# Patient Record
Sex: MALE | Race: WHITE | NOT HISPANIC OR LATINO | Employment: FULL TIME | URBAN - NONMETROPOLITAN AREA
[De-identification: names, ages, dates, MRNs, and addresses within clinical notes are randomized per-mention and may not be internally consistent; named-entity substitution may affect disease eponyms.]

---

## 2023-03-10 LAB
ALBUMIN (G/DL) IN SER/PLAS: 4.2 G/DL (ref 3.4–5)
ANION GAP IN SER/PLAS: 13 MMOL/L (ref 10–20)
CALCIUM (MG/DL) IN SER/PLAS: 9.4 MG/DL (ref 8.6–10.6)
CARBON DIOXIDE, TOTAL (MMOL/L) IN SER/PLAS: 27 MMOL/L (ref 21–32)
CARCINOEMBRYONIC AG (NG/ML) IN SER/PLAS: 1.4 UG/L
CHLORIDE (MMOL/L) IN SER/PLAS: 98 MMOL/L (ref 98–107)
CREATININE (MG/DL) IN SER/PLAS: 1.07 MG/DL (ref 0.5–1.3)
GFR MALE: 83 ML/MIN/1.73M2
GLUCOSE (MG/DL) IN SER/PLAS: 85 MG/DL (ref 74–99)
PARATHYRIN INTACT (PG/ML) IN SER/PLAS: 16.3 PG/ML (ref 18.5–88)
PHOSPHATE (MG/DL) IN SER/PLAS: 3.5 MG/DL (ref 2.5–4.9)
POTASSIUM (MMOL/L) IN SER/PLAS: 4.2 MMOL/L (ref 3.5–5.3)
SODIUM (MMOL/L) IN SER/PLAS: 134 MMOL/L (ref 136–145)
THYROTROPIN (MIU/L) IN SER/PLAS BY DETECTION LIMIT <= 0.05 MIU/L: 4.16 MIU/L (ref 0.44–3.98)
THYROXINE (T4) FREE (NG/DL) IN SER/PLAS: 1.83 NG/DL (ref 0.78–1.48)
UREA NITROGEN (MG/DL) IN SER/PLAS: 11 MG/DL (ref 6–23)

## 2023-03-14 LAB
CALCITONIN: <2 PG/ML (ref 0–7.5)
THYROGLOBULIN AB (IU/ML) IN SER/PLAS: <0.9 IU/ML (ref 0–4)
THYROGLOBULIN LC-MS/MS: ABNORMAL NG/ML (ref 1.3–31.8)
THYROGLOBULIN: 0.1 NG/ML (ref 1.3–31.8)

## 2023-03-29 ENCOUNTER — PATIENT MESSAGE (OUTPATIENT)
Dept: PRIMARY CARE | Facility: CLINIC | Age: 53
End: 2023-03-29
Payer: COMMERCIAL

## 2023-03-29 DIAGNOSIS — I10 HYPERTENSION, ESSENTIAL: Primary | ICD-10-CM

## 2023-03-29 RX ORDER — HYDROCHLOROTHIAZIDE 12.5 MG/1
12.5 CAPSULE ORAL EVERY MORNING
COMMUNITY
Start: 2023-01-29 | End: 2023-03-29 | Stop reason: DRUGHIGH

## 2023-03-29 RX ORDER — HYDROCHLOROTHIAZIDE 25 MG/1
25 TABLET ORAL DAILY
Qty: 90 TABLET | Refills: 3 | Status: SHIPPED | OUTPATIENT
Start: 2023-03-29 | End: 2023-04-18 | Stop reason: SDUPTHER

## 2023-04-18 ENCOUNTER — TELEPHONE (OUTPATIENT)
Dept: PRIMARY CARE | Facility: CLINIC | Age: 53
End: 2023-04-18
Payer: COMMERCIAL

## 2023-04-18 DIAGNOSIS — Z12.11 SCREEN FOR COLON CANCER: ICD-10-CM

## 2023-04-18 DIAGNOSIS — I10 HYPERTENSION, ESSENTIAL: ICD-10-CM

## 2023-04-19 NOTE — TELEPHONE ENCOUNTER
"----- Message from Megan Schneider MA sent at 2023 11:07 AM EDT -----  Regarding: FW: Borja meds.  [birth year]  Contact: [phone]    ----- Message -----  From: Fransico Borja \"Amol\"  Sent: 2023  11:06 AM EDT  To: [facility] Support Staff  Subject: Borja meds.  [birth year]                        Hi Dr. Smith.   Not urgent -     I should probably get on the docket for a colonoscopy. My sister in law has always been using cologuard as well and recently was diagnosed with cancer in her right colon.    If you could submit the orders and I’ll call them and schedule.  I’m off from work in early July so I’ll likely do it then.    I hope YOU are also doing well.   Kind Regards.   Amol Borja     "

## 2023-05-07 RX ORDER — HYDROCHLOROTHIAZIDE 25 MG/1
25 TABLET ORAL DAILY
Qty: 90 TABLET | Refills: 3 | Status: SHIPPED | OUTPATIENT
Start: 2023-05-07 | End: 2023-11-15

## 2023-08-17 ENCOUNTER — APPOINTMENT (OUTPATIENT)
Dept: PRIMARY CARE | Facility: CLINIC | Age: 53
End: 2023-08-17
Payer: COMMERCIAL

## 2023-08-24 ENCOUNTER — TELEPHONE (OUTPATIENT)
Dept: PRIMARY CARE | Facility: CLINIC | Age: 53
End: 2023-08-24
Payer: COMMERCIAL

## 2023-08-24 DIAGNOSIS — Z00.00 HEALTHCARE MAINTENANCE: Primary | ICD-10-CM

## 2023-09-08 ENCOUNTER — LAB (OUTPATIENT)
Dept: LAB | Facility: LAB | Age: 53
End: 2023-09-08
Payer: COMMERCIAL

## 2023-09-08 DIAGNOSIS — Z00.00 HEALTHCARE MAINTENANCE: ICD-10-CM

## 2023-09-08 PROCEDURE — 85027 COMPLETE CBC AUTOMATED: CPT

## 2023-09-08 PROCEDURE — 36415 COLL VENOUS BLD VENIPUNCTURE: CPT

## 2023-09-08 PROCEDURE — 80053 COMPREHEN METABOLIC PANEL: CPT

## 2023-09-08 PROCEDURE — 80061 LIPID PANEL: CPT

## 2023-09-09 LAB
ALANINE AMINOTRANSFERASE (SGPT) (U/L) IN SER/PLAS: 20 U/L (ref 10–52)
ALBUMIN (G/DL) IN SER/PLAS: 4.5 G/DL (ref 3.4–5)
ALKALINE PHOSPHATASE (U/L) IN SER/PLAS: 44 U/L (ref 33–120)
ANION GAP IN SER/PLAS: 16 MMOL/L (ref 10–20)
ASPARTATE AMINOTRANSFERASE (SGOT) (U/L) IN SER/PLAS: 22 U/L (ref 9–39)
BILIRUBIN TOTAL (MG/DL) IN SER/PLAS: 0.6 MG/DL (ref 0–1.2)
CALCIUM (MG/DL) IN SER/PLAS: 9.5 MG/DL (ref 8.6–10.6)
CARBON DIOXIDE, TOTAL (MMOL/L) IN SER/PLAS: 25 MMOL/L (ref 21–32)
CHLORIDE (MMOL/L) IN SER/PLAS: 97 MMOL/L (ref 98–107)
CHOLESTEROL (MG/DL) IN SER/PLAS: 194 MG/DL (ref 0–199)
CHOLESTEROL IN HDL (MG/DL) IN SER/PLAS: 59.4 MG/DL
CHOLESTEROL/HDL RATIO: 3.3
CREATININE (MG/DL) IN SER/PLAS: 0.86 MG/DL (ref 0.5–1.3)
ERYTHROCYTE DISTRIBUTION WIDTH (RATIO) BY AUTOMATED COUNT: 13.6 % (ref 11.5–14.5)
ERYTHROCYTE MEAN CORPUSCULAR HEMOGLOBIN CONCENTRATION (G/DL) BY AUTOMATED: 31.9 G/DL (ref 32–36)
ERYTHROCYTE MEAN CORPUSCULAR VOLUME (FL) BY AUTOMATED COUNT: 101 FL (ref 80–100)
ERYTHROCYTES (10*6/UL) IN BLOOD BY AUTOMATED COUNT: 4.3 X10E12/L (ref 4.5–5.9)
GFR MALE: >90 ML/MIN/1.73M2
GLUCOSE (MG/DL) IN SER/PLAS: 80 MG/DL (ref 74–99)
HEMATOCRIT (%) IN BLOOD BY AUTOMATED COUNT: 43.3 % (ref 41–52)
HEMOGLOBIN (G/DL) IN BLOOD: 13.8 G/DL (ref 13.5–17.5)
LDL: 122 MG/DL (ref 0–99)
LEUKOCYTES (10*3/UL) IN BLOOD BY AUTOMATED COUNT: 4.3 X10E9/L (ref 4.4–11.3)
NRBC (PER 100 WBCS) BY AUTOMATED COUNT: 0 /100 WBC (ref 0–0)
PLATELETS (10*3/UL) IN BLOOD AUTOMATED COUNT: 210 X10E9/L (ref 150–450)
POTASSIUM (MMOL/L) IN SER/PLAS: 4 MMOL/L (ref 3.5–5.3)
PROTEIN TOTAL: 7.1 G/DL (ref 6.4–8.2)
SODIUM (MMOL/L) IN SER/PLAS: 134 MMOL/L (ref 136–145)
TRIGLYCERIDE (MG/DL) IN SER/PLAS: 63 MG/DL (ref 0–149)
UREA NITROGEN (MG/DL) IN SER/PLAS: 15 MG/DL (ref 6–23)
VLDL: 13 MG/DL (ref 0–40)

## 2023-09-12 PROBLEM — C73 PAPILLARY THYROID CARCINOMA (MULTI): Status: ACTIVE | Noted: 2023-09-12

## 2023-09-12 PROBLEM — D72.819 LEUKOPENIA: Status: ACTIVE | Noted: 2023-09-12

## 2023-09-12 PROBLEM — C73 THYROID CANCER, MEDULLARY CARCINOMA (MULTI): Status: ACTIVE | Noted: 2023-09-12

## 2023-09-12 PROBLEM — E78.5 HYPERLIPEMIA: Status: ACTIVE | Noted: 2023-09-12

## 2023-09-12 PROBLEM — E31.22: Status: ACTIVE | Noted: 2023-09-12

## 2023-09-12 PROBLEM — E89.2: Status: ACTIVE | Noted: 2023-09-12

## 2023-09-12 PROBLEM — I10 BENIGN ESSENTIAL HYPERTENSION: Status: ACTIVE | Noted: 2023-09-12

## 2023-09-12 PROBLEM — Z14.8: Status: ACTIVE | Noted: 2023-09-12

## 2023-09-12 PROBLEM — N52.9 MALE ERECTILE DISORDER: Status: ACTIVE | Noted: 2023-09-12

## 2023-09-12 PROBLEM — M62.08 DIASTASIS RECTI: Status: ACTIVE | Noted: 2023-09-12

## 2023-09-12 PROBLEM — F41.8 PERFORMANCE ANXIETY: Status: ACTIVE | Noted: 2023-09-12

## 2023-09-12 RX ORDER — LEVOTHYROXINE SODIUM 150 UG/1
TABLET ORAL
COMMUNITY
End: 2023-12-12 | Stop reason: SDUPTHER

## 2023-09-12 RX ORDER — ASPIRIN 81 MG/1
1 TABLET ORAL DAILY
COMMUNITY
Start: 2018-04-27

## 2023-09-12 RX ORDER — SILDENAFIL CITRATE 20 MG/1
TABLET ORAL
COMMUNITY

## 2023-09-12 RX ORDER — ROSUVASTATIN CALCIUM 10 MG/1
1 TABLET, COATED ORAL DAILY
COMMUNITY
Start: 2019-03-29 | End: 2024-01-03 | Stop reason: SDUPTHER

## 2023-09-12 RX ORDER — LISINOPRIL 20 MG/1
1 TABLET ORAL DAILY
COMMUNITY
Start: 2017-06-26 | End: 2024-01-03 | Stop reason: SDUPTHER

## 2023-09-12 NOTE — PROGRESS NOTES
Subjective   Patient ID: Amol Borja is a 53 y.o. male who presents for Flu Vaccine (Pt has already had flu vaccine at this time. ) and Follow-up (Pt presents for 6 month follow up- pt states no concerns at this time. ).    HPI     Patient presents today for routine 6 month follow-up.  Patient is doing well overall, they have no new concerns or issues.     He had colonoscopy done July 2023 as sister was diagnosed with colon cancer recently. This scope showed normal mucosa, some internal hemorrhoids but no biopsy taken, repeat in 10 years per GI.    Has pork 1/2 lb every other week.  Will splurge once weekly when having breakfast with his dad.    CHRONIC CONDITIONS:  -Mood  Hx of anxiety, situational/performance related.  He is prescribed Propranolol 60 mg for as needed use.    -Leukopenia  Chronic in past, most recent baseline for patient.  9/2023 WBC 4.3     -HTN  2/2023 BP cuff validated.  9/2023 CMP    Current regimen:  Lisinopril 20 mg daily  HCTZ 25 mg daily, increased from 12.5 in 2/2023 due suboptimal control.    Medications are being taken and tolerated well.   No side effects are reported.  Patient is taking blood pressure outside of office, home numbers show BP is 120/70 this morning with calibrated cuff.  Patient denies chest pain, shortness of breath with exertion, palpitations, lower extremity edema, headache, or dizziness.     -HLD/CAD  Taking Rosuvastatin 10 mg daily.   4/2022 CT chest showed mild CAD but no CACS in our records.  9/2023 TC/HDL ratio of 3.3 ()    Medication(s) are being taken as directed and tolerated well.   No side effects reported.  Patient denies any facial droop, sudden vision loss, weakness or numbness on one side of body.   Patient denies chest pain, shortness of breath with exertion, palpitations, or symptoms of claudication.      -Post-surgical hypoparathyroidism  Followed by sanya (Dr. Gutierrez).  Hx of medullary/papillary thyroid cancer, biopsy confirmed (hx of  "MEN-2).  S/p total thyroidectomy 6/2022, calcitonin level trended down from 78 to <2.0, subsequently started on Synthroid therapy.  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  Periodic monitoring of TFTs, calcitonin, and CEA levels done via endo.  Labs last done 3/2023, synthroid dose increased, repeat labs to be done June 2023 per endo note.    -Aortic dilation  No FMHx of aortic aneurysm per patient.  4/2022 CT chest with 4 cm ectasia of the ascending thoracic aorta  Due for repeat CT in 4/2023.  3/2023 CT chest with mild fusiform ectasia of the ascending thoracic aorta measuring 4 cm in AP diameter similar to prior.    -Lung nodule  4/2022 CT chest small bilateral pulmonary nodules measuring up to 0.5 cm are nonspecific and likely benign. However, continued follow-up is recommended per clinical staging protocol given patient has history of thyroid cancer.  3/2023 CT chest with previously described 5 mm to right lower lobe nodule unchanged, likely benign.    Review of Systems   All other systems reviewed and are negative.      Objective   /90 (BP Location: Left arm, Patient Position: Sitting, BP Cuff Size: Large adult)   Pulse 69   Temp 36.8 °C (98.2 °F) (Temporal)   Resp 14   Ht 1.854 m (6' 1\")   Wt 102 kg (224 lb 3.2 oz)   SpO2 97%   BMI 29.58 kg/m²     Physical Exam  Vitals and nursing note reviewed.   Constitutional:       General: He is not in acute distress.     Appearance: Normal appearance. He is not toxic-appearing.   HENT:      Head: Normocephalic and atraumatic.   Cardiovascular:      Rate and Rhythm: Normal rate and regular rhythm.      Heart sounds: No murmur heard.     No friction rub. No gallop.   Pulmonary:      Effort: Pulmonary effort is normal.      Breath sounds: Normal breath sounds. No wheezing, " rhonchi or rales.   Musculoskeletal:      Right lower leg: No edema.      Left lower leg: No edema.   Lymphadenopathy:      Cervical: No cervical adenopathy.   Skin:     General: Skin is warm and dry.   Neurological:      General: No focal deficit present.      Mental Status: He is alert and oriented to person, place, and time.   Psychiatric:         Mood and Affect: Mood normal.         Behavior: Behavior normal.         Assessment/Plan   Problem List Items Addressed This Visit       Benign essential hypertension - Primary     BP is 147/90 in office today, this is mildly elevated. However, patient reports normotensive pressures at home with cuff that has been calibrated in office. We will treat based off home numbers.    Continue Lisinopril 20 mg daily.  Continue HCTZ 25 mg daily.         Hyperlipemia     4/2022 CT chest showed mild CAD but no CACS in our records.  9/2023 TC/HDL ratio of 3.3 ()  Goal TC/HDL ratio 3.4 or less, LDL 99 or less,  or less, and TRIG 150 or less.   Cholesterol at goal, continue with Rosuvastatin 10 mg daily.          Leukopenia     Chronic in past, most recent baseline for patient.  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 Gutierrez)  S/p total thyroidectomy 6/2022, currently on Synthroid therapy.  Periodic monitoring of TFTs, calcitonin, and CEA levels done via endo.   Stable at most recent check per patient.         Post-surgical hypoparathyroidism (CMS/HCC)     Followed and managed by endo (Dr. Gutierrez)  S/p total thyroidectomy 6/2022, currently on Synthroid therapy.  Periodic monitoring of TFTs, calcitonin, and CEA levels done via endo.   Stable at most recent check per patient.         Thyroid cancer, medullary carcinoma (CMS/HCC)     Followed and managed by endo (Dr. Gutierrez)  S/p total thyroidectomy 6/2022, currently on Synthroid therapy.  Periodic monitoring of TFTs, calcitonin, and CEA levels done  via endo.   Stable at most recent check per patient.         Papillary thyroid carcinoma (CMS/HCC)     Followed and managed by endo (Dr. Gutierrez)  S/p total thyroidectomy 6/2022, currently on Synthroid therapy.  Periodic monitoring of TFTs, calcitonin, and CEA levels done via endo.   Stable at most recent check per patient.         Ascending aorta dilation (CMS/HCC)     Stable per most recent CT chest done 3/2023 which showed mild fusiform ectasia of the ascending thoracic aorta measuring 4 cm in AP diameter similar to prior. No family history aneurysm, no further follow-up indicated at this time.          Labs ordered to be done in 6 months (these were linked to physical).  Follow-up in 6 months for routine care + CPE, can move to 1 year if labs are WNL.  Call for sooner follow-up if needed.     Scribe Attestation  By signing my name below, IYanira Scribe   attest that this documentation has been prepared under the direction and in the presence of Romi Smith DO.

## 2023-09-13 ENCOUNTER — OFFICE VISIT (OUTPATIENT)
Dept: PRIMARY CARE | Facility: CLINIC | Age: 53
End: 2023-09-13
Payer: COMMERCIAL

## 2023-09-13 VITALS
WEIGHT: 224.2 LBS | RESPIRATION RATE: 14 BRPM | OXYGEN SATURATION: 97 % | SYSTOLIC BLOOD PRESSURE: 147 MMHG | TEMPERATURE: 98.2 F | HEART RATE: 69 BPM | HEIGHT: 73 IN | DIASTOLIC BLOOD PRESSURE: 90 MMHG | BODY MASS INDEX: 29.72 KG/M2

## 2023-09-13 DIAGNOSIS — E31.22: ICD-10-CM

## 2023-09-13 DIAGNOSIS — E89.2 POST-SURGICAL HYPOPARATHYROIDISM (MULTI): ICD-10-CM

## 2023-09-13 DIAGNOSIS — C73 THYROID CANCER, MEDULLARY CARCINOMA (MULTI): ICD-10-CM

## 2023-09-13 DIAGNOSIS — I77.810 ASCENDING AORTA DILATION (CMS-HCC): ICD-10-CM

## 2023-09-13 DIAGNOSIS — C73 PAPILLARY THYROID CARCINOMA (MULTI): ICD-10-CM

## 2023-09-13 DIAGNOSIS — Z00.00 HEALTHCARE MAINTENANCE: ICD-10-CM

## 2023-09-13 DIAGNOSIS — I10 BENIGN ESSENTIAL HYPERTENSION: Primary | ICD-10-CM

## 2023-09-13 DIAGNOSIS — E78.5 HYPERLIPIDEMIA, UNSPECIFIED HYPERLIPIDEMIA TYPE: ICD-10-CM

## 2023-09-13 DIAGNOSIS — Z12.5 SCREENING FOR PROSTATE CANCER: ICD-10-CM

## 2023-09-13 DIAGNOSIS — D72.819 LEUKOPENIA, UNSPECIFIED TYPE: ICD-10-CM

## 2023-09-13 PROBLEM — R91.1 LUNG NODULE: Status: ACTIVE | Noted: 2023-09-13

## 2023-09-13 PROBLEM — I25.10 MILD CAD: Status: ACTIVE | Noted: 2023-09-13

## 2023-09-13 PROCEDURE — 99214 OFFICE O/P EST MOD 30 MIN: CPT | Performed by: FAMILY MEDICINE

## 2023-09-13 PROCEDURE — 3080F DIAST BP >= 90 MM HG: CPT | Performed by: FAMILY MEDICINE

## 2023-09-13 PROCEDURE — 3077F SYST BP >= 140 MM HG: CPT | Performed by: FAMILY MEDICINE

## 2023-09-13 PROCEDURE — 1036F TOBACCO NON-USER: CPT | Performed by: FAMILY MEDICINE

## 2023-09-13 RX ORDER — PROPRANOLOL HYDROCHLORIDE 60 MG/1
60 CAPSULE, EXTENDED RELEASE ORAL DAILY
COMMUNITY
End: 2024-01-04 | Stop reason: SDUPTHER

## 2023-09-13 NOTE — ASSESSMENT & PLAN NOTE
Stable per most recent CT chest done 3/2023 which showed mild fusiform ectasia of the ascending thoracic aorta measuring 4 cm in AP diameter similar to prior. No family history aneurysm, no further follow-up indicated at this time.

## 2023-09-13 NOTE — ASSESSMENT & PLAN NOTE
Followed and managed by endo (Dr. Gutierrez)  S/p total thyroidectomy 6/2022, currently on Synthroid therapy.  Periodic monitoring of TFTs, calcitonin, and CEA levels done via endo.   Stable at most recent check per patient.

## 2023-09-13 NOTE — ASSESSMENT & PLAN NOTE
4/2022 CT chest showed mild CAD but no CACS in our records.  9/2023 TC/HDL ratio of 3.3 ()  Goal TC/HDL ratio 3.4 or less, LDL 99 or less,  or less, and TRIG 150 or less.   Cholesterol at goal, continue with Rosuvastatin 10 mg daily.

## 2023-09-13 NOTE — ASSESSMENT & PLAN NOTE
BP is 147/90 in office today, this is mildly elevated. However, patient reports normotensive pressures at home with cuff that has been calibrated in office. We will treat based off home numbers.    Continue Lisinopril 20 mg daily.  Continue HCTZ 25 mg daily.

## 2023-11-15 DIAGNOSIS — I10 HYPERTENSION, ESSENTIAL: ICD-10-CM

## 2023-11-15 RX ORDER — HYDROCHLOROTHIAZIDE 25 MG/1
25 TABLET ORAL DAILY
Qty: 90 TABLET | Refills: 3 | Status: SHIPPED | OUTPATIENT
Start: 2023-11-15

## 2023-11-16 DIAGNOSIS — E89.2 POST-SURGICAL HYPOPARATHYROIDISM (MULTI): ICD-10-CM

## 2023-11-16 DIAGNOSIS — C73 PAPILLARY THYROID CARCINOMA (MULTI): ICD-10-CM

## 2023-11-16 DIAGNOSIS — E31.22: Primary | ICD-10-CM

## 2023-11-17 NOTE — PROGRESS NOTES
Patient requested lab orders to be placed. He last completed bloodwork in March 2023. Per documentation at that time, levothyroxine was increased to 150mcg 7.5x/week (from 7x/week).     I have placed orders for TSH, FT4, thyroglobulin, CEA, calcitonin, RFP, and PTH. I also instructed patient that he needs to make a follow-up appointment with us and gave him the scheduling phone number (he was last seen in clinic in 8/2022).

## 2023-12-01 ENCOUNTER — LAB (OUTPATIENT)
Dept: LAB | Facility: LAB | Age: 53
End: 2023-12-01
Payer: COMMERCIAL

## 2023-12-01 DIAGNOSIS — C73 PAPILLARY THYROID CARCINOMA (MULTI): ICD-10-CM

## 2023-12-01 DIAGNOSIS — E89.2 POST-SURGICAL HYPOPARATHYROIDISM (MULTI): ICD-10-CM

## 2023-12-01 DIAGNOSIS — E31.22: ICD-10-CM

## 2023-12-01 LAB
ALBUMIN SERPL BCP-MCNC: 4.7 G/DL (ref 3.4–5)
ANION GAP SERPL CALC-SCNC: 12 MMOL/L (ref 10–20)
BUN SERPL-MCNC: 19 MG/DL (ref 6–23)
CALCIUM SERPL-MCNC: 9.6 MG/DL (ref 8.6–10.6)
CEA SERPL-MCNC: 1.5 UG/L
CHLORIDE SERPL-SCNC: 99 MMOL/L (ref 98–107)
CO2 SERPL-SCNC: 28 MMOL/L (ref 21–32)
CREAT SERPL-MCNC: 1.1 MG/DL (ref 0.5–1.3)
GFR SERPL CREATININE-BSD FRML MDRD: 80 ML/MIN/1.73M*2
GLUCOSE SERPL-MCNC: 99 MG/DL (ref 74–99)
PHOSPHATE SERPL-MCNC: 3.7 MG/DL (ref 2.5–4.9)
POTASSIUM SERPL-SCNC: 4 MMOL/L (ref 3.5–5.3)
PTH-INTACT SERPL-MCNC: 15.1 PG/ML (ref 18.5–88)
SODIUM SERPL-SCNC: 135 MMOL/L (ref 136–145)
T4 FREE SERPL-MCNC: 1.84 NG/DL (ref 0.78–1.48)
TSH SERPL-ACNC: 1.37 MIU/L (ref 0.44–3.98)

## 2023-12-01 PROCEDURE — 82308 ASSAY OF CALCITONIN: CPT

## 2023-12-01 PROCEDURE — 83970 ASSAY OF PARATHORMONE: CPT

## 2023-12-01 PROCEDURE — 86800 THYROGLOBULIN ANTIBODY: CPT

## 2023-12-01 PROCEDURE — 36415 COLL VENOUS BLD VENIPUNCTURE: CPT

## 2023-12-01 PROCEDURE — 84432 ASSAY OF THYROGLOBULIN: CPT

## 2023-12-01 PROCEDURE — 82378 CARCINOEMBRYONIC ANTIGEN: CPT

## 2023-12-01 PROCEDURE — 80069 RENAL FUNCTION PANEL: CPT

## 2023-12-01 PROCEDURE — 84443 ASSAY THYROID STIM HORMONE: CPT

## 2023-12-01 PROCEDURE — 84439 ASSAY OF FREE THYROXINE: CPT

## 2023-12-02 LAB
BILL ONLY-THYROGLOBULIN: NORMAL
CALCIT SERPL-MCNC: <2 PG/ML (ref 0–7.5)
THYROGLOB AB SERPL-ACNC: <0.9 IU/ML (ref 0–4)
THYROGLOB SERPL-MCNC: 0.1 NG/ML (ref 1.3–31.8)
THYROGLOB SERPL-MCNC: ABNORMAL NG/ML (ref 1.3–31.8)

## 2023-12-12 ENCOUNTER — TELEPHONE (OUTPATIENT)
Dept: ENDOCRINOLOGY | Facility: HOSPITAL | Age: 53
End: 2023-12-12
Payer: COMMERCIAL

## 2023-12-12 DIAGNOSIS — C73 PAPILLARY THYROID CARCINOMA (MULTI): ICD-10-CM

## 2023-12-12 DIAGNOSIS — C73 THYROID CANCER, MEDULLARY CARCINOMA (MULTI): Primary | ICD-10-CM

## 2023-12-12 RX ORDER — LEVOTHYROXINE SODIUM 150 UG/1
150 TABLET ORAL DAILY
Qty: 90 TABLET | Refills: 3 | Status: SHIPPED | OUTPATIENT
Start: 2023-12-12 | End: 2024-02-13 | Stop reason: SDUPTHER

## 2024-01-03 DIAGNOSIS — I10 BENIGN ESSENTIAL HYPERTENSION: ICD-10-CM

## 2024-01-03 DIAGNOSIS — E78.5 HYPERLIPIDEMIA, UNSPECIFIED HYPERLIPIDEMIA TYPE: ICD-10-CM

## 2024-01-04 RX ORDER — PROPRANOLOL HYDROCHLORIDE 60 MG/1
60 CAPSULE, EXTENDED RELEASE ORAL DAILY
Qty: 90 CAPSULE | Refills: 0 | Status: SHIPPED | OUTPATIENT
Start: 2024-01-04

## 2024-01-04 RX ORDER — ROSUVASTATIN CALCIUM 10 MG/1
10 TABLET, COATED ORAL DAILY
Qty: 90 TABLET | Refills: 0 | Status: SHIPPED | OUTPATIENT
Start: 2024-01-04 | End: 2024-05-29

## 2024-01-04 RX ORDER — LISINOPRIL 20 MG/1
20 TABLET ORAL DAILY
Qty: 90 TABLET | Refills: 0 | Status: SHIPPED | OUTPATIENT
Start: 2024-01-04 | End: 2024-05-29

## 2024-02-02 ENCOUNTER — LAB (OUTPATIENT)
Dept: LAB | Facility: LAB | Age: 54
End: 2024-02-02
Payer: COMMERCIAL

## 2024-02-13 DIAGNOSIS — C73 PAPILLARY THYROID CARCINOMA (MULTI): ICD-10-CM

## 2024-02-13 DIAGNOSIS — C73 THYROID CANCER, MEDULLARY CARCINOMA (MULTI): Primary | ICD-10-CM

## 2024-02-13 RX ORDER — LEVOTHYROXINE SODIUM 150 UG/1
150 TABLET ORAL DAILY
Qty: 90 TABLET | Refills: 0 | Status: SHIPPED | OUTPATIENT
Start: 2024-02-13 | End: 2024-05-13

## 2024-02-13 RX ORDER — LEVOTHYROXINE SODIUM 150 UG/1
150 TABLET ORAL DAILY
Qty: 90 TABLET | Refills: 3 | Status: SHIPPED | OUTPATIENT
Start: 2024-02-13

## 2024-02-13 NOTE — PROGRESS NOTES
Patient requesting renewal of levothyroxine, sent to pharmacy. Has appointment scheduled for 5/31.

## 2024-03-08 ENCOUNTER — LAB (OUTPATIENT)
Dept: LAB | Facility: LAB | Age: 54
End: 2024-03-08
Payer: COMMERCIAL

## 2024-03-08 DIAGNOSIS — Z12.5 SCREENING FOR PROSTATE CANCER: ICD-10-CM

## 2024-03-08 DIAGNOSIS — Z00.00 HEALTHCARE MAINTENANCE: ICD-10-CM

## 2024-03-08 LAB
ANION GAP SERPL CALC-SCNC: 12 MMOL/L (ref 10–20)
BUN SERPL-MCNC: 14 MG/DL (ref 6–23)
CALCIUM SERPL-MCNC: 9.6 MG/DL (ref 8.6–10.6)
CHLORIDE SERPL-SCNC: 100 MMOL/L (ref 98–107)
CHOLEST SERPL-MCNC: 160 MG/DL (ref 0–199)
CHOLESTEROL/HDL RATIO: 3.2
CO2 SERPL-SCNC: 30 MMOL/L (ref 21–32)
CREAT SERPL-MCNC: 1.01 MG/DL (ref 0.5–1.3)
EGFRCR SERPLBLD CKD-EPI 2021: 88 ML/MIN/1.73M*2
ERYTHROCYTE [DISTWIDTH] IN BLOOD BY AUTOMATED COUNT: 13.2 % (ref 11.5–14.5)
GLUCOSE SERPL-MCNC: 86 MG/DL (ref 74–99)
HCT VFR BLD AUTO: 43.2 % (ref 41–52)
HDLC SERPL-MCNC: 49.9 MG/DL
HGB BLD-MCNC: 13.4 G/DL (ref 13.5–17.5)
LDLC SERPL CALC-MCNC: 97 MG/DL
MCH RBC QN AUTO: 30.7 PG (ref 26–34)
MCHC RBC AUTO-ENTMCNC: 31 G/DL (ref 32–36)
MCV RBC AUTO: 99 FL (ref 80–100)
NON HDL CHOLESTEROL: 110 MG/DL (ref 0–149)
NRBC BLD-RTO: 0 /100 WBCS (ref 0–0)
PLATELET # BLD AUTO: 202 X10*3/UL (ref 150–450)
POTASSIUM SERPL-SCNC: 4 MMOL/L (ref 3.5–5.3)
PSA SERPL-MCNC: 1.91 NG/ML
RBC # BLD AUTO: 4.37 X10*6/UL (ref 4.5–5.9)
SODIUM SERPL-SCNC: 138 MMOL/L (ref 136–145)
TRIGL SERPL-MCNC: 67 MG/DL (ref 0–149)
VLDL: 13 MG/DL (ref 0–40)
WBC # BLD AUTO: 4.4 X10*3/UL (ref 4.4–11.3)

## 2024-03-08 PROCEDURE — 80048 BASIC METABOLIC PNL TOTAL CA: CPT

## 2024-03-08 PROCEDURE — 84153 ASSAY OF PSA TOTAL: CPT

## 2024-03-08 PROCEDURE — 85027 COMPLETE CBC AUTOMATED: CPT

## 2024-03-08 PROCEDURE — 36415 COLL VENOUS BLD VENIPUNCTURE: CPT

## 2024-03-08 PROCEDURE — 80061 LIPID PANEL: CPT

## 2024-03-14 ENCOUNTER — APPOINTMENT (OUTPATIENT)
Dept: PRIMARY CARE | Facility: CLINIC | Age: 54
End: 2024-03-14
Payer: COMMERCIAL

## 2024-03-18 ENCOUNTER — TELEPHONE (OUTPATIENT)
Dept: PRIMARY CARE | Facility: CLINIC | Age: 54
End: 2024-03-18
Payer: COMMERCIAL

## 2024-03-18 DIAGNOSIS — Z00.00 HEALTHCARE MAINTENANCE: Primary | ICD-10-CM

## 2024-03-18 DIAGNOSIS — Z12.5 SCREENING FOR PROSTATE CANCER: ICD-10-CM

## 2024-03-19 NOTE — TELEPHONE ENCOUNTER
Called and spoke with pt, and got his apt rescheduled to 9/26/24. Pt would like you to place orders for labs to be done prior to his apt. Please advise.

## 2024-03-19 NOTE — TELEPHONE ENCOUNTER
Pls cancel upcoming appt w pt ,    pls call him to schedule a 6 mo     (Labs all normal,  bp's good per pt)       Thx

## 2024-03-28 ENCOUNTER — APPOINTMENT (OUTPATIENT)
Dept: PRIMARY CARE | Facility: CLINIC | Age: 54
End: 2024-03-28
Payer: COMMERCIAL

## 2024-03-28 DIAGNOSIS — I10 BENIGN ESSENTIAL HYPERTENSION: ICD-10-CM

## 2024-03-28 RX ORDER — PROPRANOLOL HYDROCHLORIDE 60 MG/1
CAPSULE, EXTENDED RELEASE ORAL
Qty: 90 CAPSULE | Refills: 3 | OUTPATIENT
Start: 2024-03-28

## 2024-05-07 DIAGNOSIS — C73 THYROID CANCER, MEDULLARY CARCINOMA (MULTI): ICD-10-CM

## 2024-05-07 DIAGNOSIS — C73 PAPILLARY THYROID CARCINOMA (MULTI): Primary | ICD-10-CM

## 2024-05-10 ENCOUNTER — LAB (OUTPATIENT)
Dept: LAB | Facility: LAB | Age: 54
End: 2024-05-10
Payer: COMMERCIAL

## 2024-05-10 DIAGNOSIS — C73 THYROID CANCER, MEDULLARY CARCINOMA (MULTI): ICD-10-CM

## 2024-05-10 DIAGNOSIS — C73 PAPILLARY THYROID CARCINOMA (MULTI): ICD-10-CM

## 2024-05-10 LAB
ALBUMIN SERPL BCP-MCNC: 4.2 G/DL (ref 3.4–5)
ANION GAP SERPL CALC-SCNC: 15 MMOL/L (ref 10–20)
BUN SERPL-MCNC: 18 MG/DL (ref 6–23)
CALCIUM SERPL-MCNC: 9.4 MG/DL (ref 8.6–10.6)
CEA SERPL-MCNC: 0.5 UG/L
CHLORIDE SERPL-SCNC: 101 MMOL/L (ref 98–107)
CO2 SERPL-SCNC: 26 MMOL/L (ref 21–32)
CREAT SERPL-MCNC: 1.09 MG/DL (ref 0.5–1.3)
EGFRCR SERPLBLD CKD-EPI 2021: 80 ML/MIN/1.73M*2
GLUCOSE SERPL-MCNC: 85 MG/DL (ref 74–99)
PHOSPHATE SERPL-MCNC: 3.2 MG/DL (ref 2.5–4.9)
POTASSIUM SERPL-SCNC: 4.6 MMOL/L (ref 3.5–5.3)
PTH-INTACT SERPL-MCNC: 14.6 PG/ML (ref 18.5–88)
SODIUM SERPL-SCNC: 137 MMOL/L (ref 136–145)
T4 FREE SERPL-MCNC: 1.77 NG/DL (ref 0.78–1.48)
TSH SERPL-ACNC: 1.36 MIU/L (ref 0.44–3.98)

## 2024-05-10 PROCEDURE — 36415 COLL VENOUS BLD VENIPUNCTURE: CPT

## 2024-05-10 PROCEDURE — 84439 ASSAY OF FREE THYROXINE: CPT

## 2024-05-10 PROCEDURE — 84432 ASSAY OF THYROGLOBULIN: CPT

## 2024-05-10 PROCEDURE — 86800 THYROGLOBULIN ANTIBODY: CPT

## 2024-05-10 PROCEDURE — 84443 ASSAY THYROID STIM HORMONE: CPT

## 2024-05-10 PROCEDURE — 82308 ASSAY OF CALCITONIN: CPT

## 2024-05-10 PROCEDURE — 83970 ASSAY OF PARATHORMONE: CPT

## 2024-05-10 PROCEDURE — 80069 RENAL FUNCTION PANEL: CPT

## 2024-05-10 PROCEDURE — 82378 CARCINOEMBRYONIC ANTIGEN: CPT

## 2024-05-11 LAB
BILL ONLY-THYROGLOBULIN: NORMAL
THYROGLOB AB SERPL-ACNC: <0.9 IU/ML (ref 0–4)
THYROGLOB SERPL-MCNC: 0.1 NG/ML (ref 1.3–31.8)
THYROGLOB SERPL-MCNC: ABNORMAL NG/ML (ref 1.3–31.8)

## 2024-05-12 LAB — CALCIT SERPL-MCNC: <2 PG/ML (ref 0–7.5)

## 2024-05-29 DIAGNOSIS — I10 BENIGN ESSENTIAL HYPERTENSION: ICD-10-CM

## 2024-05-29 DIAGNOSIS — E78.5 HYPERLIPIDEMIA, UNSPECIFIED HYPERLIPIDEMIA TYPE: ICD-10-CM

## 2024-05-29 RX ORDER — ROSUVASTATIN CALCIUM 10 MG/1
10 TABLET, COATED ORAL DAILY
Qty: 90 TABLET | Refills: 0 | Status: SHIPPED | OUTPATIENT
Start: 2024-05-29

## 2024-05-29 RX ORDER — LISINOPRIL 20 MG/1
20 TABLET ORAL DAILY
Qty: 90 TABLET | Refills: 0 | Status: SHIPPED | OUTPATIENT
Start: 2024-05-29

## 2024-05-31 ENCOUNTER — OFFICE VISIT (OUTPATIENT)
Dept: ENDOCRINOLOGY | Facility: CLINIC | Age: 54
End: 2024-05-31
Payer: COMMERCIAL

## 2024-05-31 VITALS
HEIGHT: 73 IN | WEIGHT: 227 LBS | DIASTOLIC BLOOD PRESSURE: 95 MMHG | HEART RATE: 72 BPM | BODY MASS INDEX: 30.09 KG/M2 | SYSTOLIC BLOOD PRESSURE: 152 MMHG

## 2024-05-31 DIAGNOSIS — C73 THYROID CANCER (MULTI): Primary | ICD-10-CM

## 2024-05-31 PROCEDURE — 3080F DIAST BP >= 90 MM HG: CPT | Performed by: INTERNAL MEDICINE

## 2024-05-31 PROCEDURE — 99214 OFFICE O/P EST MOD 30 MIN: CPT | Performed by: INTERNAL MEDICINE

## 2024-05-31 PROCEDURE — 3077F SYST BP >= 140 MM HG: CPT | Performed by: INTERNAL MEDICINE

## 2024-05-31 PROCEDURE — 1036F TOBACCO NON-USER: CPT | Performed by: INTERNAL MEDICINE

## 2024-05-31 ASSESSMENT — PAIN SCALES - GENERAL: PAINLEVEL: 0-NO PAIN

## 2024-05-31 NOTE — PROGRESS NOTES
Chief Complaint     Patient presents here today for a follow up visit for medullar and papillary thyroid cancer.      History of Present IllnessFransico Borja is a 54 year old male who presents for a routine follow up of medullary and papillary thyroid cancer.     Thyroid Cancer History:  54 year old male with MEN-2 syndrome and an extensive family history for medullary thyroid cancer. The patient was evaluated in our clinic for pheochromocytoma and there was no clinical or biochemical evidence of pheochromocytoma. He was found to have a nodular thyroid gland on CT scan. 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 He is status post total thyroidectomy with central neck dissection complicated by compressive hematoma and taken back emergently to the OR for neck exploration with hemostasis control on 6/28/22.      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  Patient is doing well with no acute concerns. Taking calcium as prescribed. He denies paresthesias of his hands/feet, perioral region and extremities. Appropriate energy. Regular bowel movements. No temperature intolerances. No dysphagia or dyspnea. Has cramps in his hands/ feet at times.   10 point ROS is obtained and negative except as mentioned above.  BP is ok on meds; no changes   Meds: T 4 150 micrograms x 8/week.       Physical Exam  Constitutional: well-developed, well-nourished,  male, in no acute distress   HEENT: clear sclera, EOMI  Skin: Skin is appropriately  warm & dry.   Neck Supple. No lymphadenopathy, thyroid not palpable, surgical scar well healed  Pulmonary: Clear to auscultation bilaterally.   Cardiovascular: Regular rate and rhythm. Normal S1 and S2, no gallops, no murmurs   Musculoskeletal: No proximal muscle weakness   Neurologic : AAO3. No gait abnormalities. Deep tendon reflexes are normal and without delay in the return phase. Chvostek negative.  Psych: pleasant, cooperative    Impression/ Plan    Doing well; clinically and biochemically euthyroid. Recent labs reviewed all OK. No change in meds. Will keep same; return in one year.      David Gutierrez MD

## 2024-07-10 ENCOUNTER — TELEPHONE (OUTPATIENT)
Dept: PRIMARY CARE | Facility: CLINIC | Age: 54
End: 2024-07-10
Payer: COMMERCIAL

## 2024-07-10 NOTE — TELEPHONE ENCOUNTER
Called and spoke with pt, and he was informed and verbalized understanding. Pt is scheduled to be seen in about 3 weeks with SVN for this.

## 2024-07-31 ENCOUNTER — APPOINTMENT (OUTPATIENT)
Dept: PRIMARY CARE | Facility: CLINIC | Age: 54
End: 2024-07-31
Payer: COMMERCIAL

## 2024-07-31 VITALS
HEART RATE: 59 BPM | DIASTOLIC BLOOD PRESSURE: 83 MMHG | TEMPERATURE: 97.6 F | SYSTOLIC BLOOD PRESSURE: 128 MMHG | OXYGEN SATURATION: 96 % | WEIGHT: 231 LBS | BODY MASS INDEX: 30.48 KG/M2

## 2024-07-31 DIAGNOSIS — F41.8 PERFORMANCE ANXIETY: Primary | ICD-10-CM

## 2024-07-31 DIAGNOSIS — M76.60 ACHILLES TENDINITIS, UNSPECIFIED LATERALITY: ICD-10-CM

## 2024-07-31 PROCEDURE — 99213 OFFICE O/P EST LOW 20 MIN: CPT | Performed by: FAMILY MEDICINE

## 2024-07-31 PROCEDURE — 3074F SYST BP LT 130 MM HG: CPT | Performed by: FAMILY MEDICINE

## 2024-07-31 PROCEDURE — 3079F DIAST BP 80-89 MM HG: CPT | Performed by: FAMILY MEDICINE

## 2024-07-31 PROCEDURE — 1036F TOBACCO NON-USER: CPT | Performed by: FAMILY MEDICINE

## 2024-07-31 RX ORDER — MELOXICAM 15 MG/1
15 TABLET ORAL DAILY
Qty: 30 TABLET | Refills: 11 | Status: SHIPPED | OUTPATIENT
Start: 2024-07-31 | End: 2024-07-31

## 2024-07-31 RX ORDER — DICLOFENAC SODIUM 10 MG/G
GEL TOPICAL
Qty: 50 G | Refills: 1 | Status: SHIPPED | OUTPATIENT
Start: 2024-07-31

## 2024-07-31 RX ORDER — PROPRANOLOL HYDROCHLORIDE 60 MG/1
60 CAPSULE, EXTENDED RELEASE ORAL DAILY
Qty: 90 CAPSULE | Refills: 3 | Status: SHIPPED | OUTPATIENT
Start: 2024-07-31

## 2024-07-31 RX ORDER — MELOXICAM 15 MG/1
15 TABLET ORAL DAILY
Qty: 30 TABLET | Refills: 11 | Status: SHIPPED | OUTPATIENT
Start: 2024-07-31 | End: 2025-07-31

## 2024-07-31 NOTE — PROGRESS NOTES
Subjective   Patient ID: Amol Borja is a 54 y.o. male who presents for Follow-up (States bad right achilles, swollen, was working on an incline, not taking or trying anything for it to make better and stating doing a little better this week and has been bothering for past 4 months).    HPI    Patient here for evaluation of right foot pain/achilles flare.    Issues ongoing for a bout 4 months  Notices worse in morning or after prolonged rest, gets better when up and moving.  He was walking on incline on treadmill but stopped, some improvement but did not resolve.    He thought he was doing better this week but here today because he has been experiencing some swelling in right achilles area.    No home treatments  Not taking any NSAIDs or Tylenol  Not wearing any braces  Not doing regularly stretching    No recent quinolones rx'ed  No hx of prostatitis  No hx of GI bleed or stomach upset.    Requesting refill of inderal, uses PRN for sitautional anxiety such as public speaking, meetings, etc.  Uses maybe 3 x per week, tolerating well without side effects.      Review of Systems   All other systems reviewed and are negative.      Objective   /83 (BP Location: Left arm, Patient Position: Sitting, BP Cuff Size: Large adult)   Pulse 59   Temp 36.4 °C (97.6 °F) (Temporal)   Wt 105 kg (231 lb)   SpO2 96%   BMI 30.48 kg/m²     Physical Exam  Vitals and nursing note reviewed.   Constitutional:       General: He is not in acute distress.     Appearance: Normal appearance. He is not toxic-appearing.   HENT:      Head: Normocephalic and atraumatic.   Cardiovascular:      Rate and Rhythm: Normal rate and regular rhythm.      Heart sounds: No murmur heard.     No friction rub. No gallop.   Pulmonary:      Effort: Pulmonary effort is normal.      Breath sounds: Normal breath sounds. No wheezing, rhonchi or rales.   Musculoskeletal:      Comments: Right distal achilles with edema and slight warmth, no erythema  Gait  able to toe up   Skin:     General: Skin is warm and dry.   Neurological:      General: No focal deficit present.      Mental Status: He is alert and oriented to person, place, and time.   Psychiatric:         Mood and Affect: Mood normal.         Behavior: Behavior normal.         Assessment/Plan   Problem List Items Addressed This Visit             ICD-10-CM    Performance anxiety - Primary F41.8     Refilled inderal today at patient request         Relevant Medications    propranolol LA (Inderal LA) 60 mg 24 hr capsule    Achilles tendinitis M76.60     I recommend physical therapy, defers referral today opting to work on PT exercises independently.  Make sure to do stretching exercises, eccentric exercises demonstrated in office today.    Can purchase a dorsiflexion splint to be worn at night, helps to get foot stretched.     Activity modification reviewed with patient, avoid what requires toe-ing off such as walking, running, etc.    Meloxicam has been sent to your pharmacy. Please only use as needed instead of taking daily. Know that there is an extremely small but real risk of heart attack or stroke with use of this medicine, particularly if a person has cardiac risk factors such as high blood pressure, cholesterol, diabetes. You cannot use any other NSAIDS such as ibuprofen (Motrin or Advil) or naproxen (Aleve) with this medication. Acetaminophen (Tylenol) is safe to use with NSAIDS.     May use diclofenac gel which is a topical ointment that can help with joint inflammation. You can rub this on the inflamed area up to two times a day to help quiet down joint pain.    In abundance of caution referral to sports medicine has been placed today for patient.                Follow-up as previously scheduled for routine care.  Call for sooner follow-up if needed.       Scribe Attestation  By signing my name below, IYanira Scribe   attest that this documentation has been prepared under the direction and in  the presence of Romi Smith DO.

## 2024-07-31 NOTE — ASSESSMENT & PLAN NOTE
I recommend physical therapy, defers referral today opting to work on PT exercises independently.  Make sure to do stretching exercises, eccentric exercises demonstrated in office today.    Can purchase a dorsiflexion splint to be worn at night, helps to get foot stretched.     Activity modification reviewed with patient, avoid what requires toe-ing off such as walking, running, etc.    Meloxicam has been sent to your pharmacy. Please only use as needed instead of taking daily. Know that there is an extremely small but real risk of heart attack or stroke with use of this medicine, particularly if a person has cardiac risk factors such as high blood pressure, cholesterol, diabetes. You cannot use any other NSAIDS such as ibuprofen (Motrin or Advil) or naproxen (Aleve) with this medication. Acetaminophen (Tylenol) is safe to use with NSAIDS.     May use diclofenac gel which is a topical ointment that can help with joint inflammation. You can rub this on the inflamed area up to two times a day to help quiet down joint pain.    In abundance of caution referral to sports medicine has been placed today for patient.

## 2024-08-09 DIAGNOSIS — I10 BENIGN ESSENTIAL HYPERTENSION: ICD-10-CM

## 2024-08-09 DIAGNOSIS — E78.5 HYPERLIPIDEMIA, UNSPECIFIED HYPERLIPIDEMIA TYPE: ICD-10-CM

## 2024-08-09 RX ORDER — ROSUVASTATIN CALCIUM 10 MG/1
10 TABLET, COATED ORAL DAILY
Qty: 90 TABLET | Refills: 3 | Status: SHIPPED | OUTPATIENT
Start: 2024-08-09

## 2024-08-09 RX ORDER — LISINOPRIL 20 MG/1
20 TABLET ORAL DAILY
Qty: 90 TABLET | Refills: 3 | Status: SHIPPED | OUTPATIENT
Start: 2024-08-09

## 2024-08-30 ENCOUNTER — LAB (OUTPATIENT)
Dept: LAB | Facility: LAB | Age: 54
End: 2024-08-30
Payer: COMMERCIAL

## 2024-08-30 DIAGNOSIS — Z00.00 HEALTHCARE MAINTENANCE: ICD-10-CM

## 2024-08-30 DIAGNOSIS — Z12.5 SCREENING FOR PROSTATE CANCER: ICD-10-CM

## 2024-08-30 LAB
ALBUMIN SERPL BCP-MCNC: 4.1 G/DL (ref 3.4–5)
ALP SERPL-CCNC: 40 U/L (ref 33–120)
ALT SERPL W P-5'-P-CCNC: 22 U/L (ref 10–52)
ANION GAP SERPL CALC-SCNC: 13 MMOL/L (ref 10–20)
AST SERPL W P-5'-P-CCNC: 16 U/L (ref 9–39)
BILIRUB SERPL-MCNC: 0.5 MG/DL (ref 0–1.2)
BUN SERPL-MCNC: 16 MG/DL (ref 6–23)
CALCIUM SERPL-MCNC: 9.6 MG/DL (ref 8.6–10.6)
CHLORIDE SERPL-SCNC: 101 MMOL/L (ref 98–107)
CHOLEST SERPL-MCNC: 154 MG/DL (ref 0–199)
CHOLESTEROL/HDL RATIO: 3.1
CO2 SERPL-SCNC: 29 MMOL/L (ref 21–32)
CREAT SERPL-MCNC: 1.14 MG/DL (ref 0.5–1.3)
EGFRCR SERPLBLD CKD-EPI 2021: 76 ML/MIN/1.73M*2
ERYTHROCYTE [DISTWIDTH] IN BLOOD BY AUTOMATED COUNT: 13.4 % (ref 11.5–14.5)
GLUCOSE SERPL-MCNC: 99 MG/DL (ref 74–99)
HCT VFR BLD AUTO: 40 % (ref 41–52)
HDLC SERPL-MCNC: 49.1 MG/DL
HGB BLD-MCNC: 12.9 G/DL (ref 13.5–17.5)
LDLC SERPL CALC-MCNC: 87 MG/DL
MCH RBC QN AUTO: 31 PG (ref 26–34)
MCHC RBC AUTO-ENTMCNC: 32.3 G/DL (ref 32–36)
MCV RBC AUTO: 96 FL (ref 80–100)
NON HDL CHOLESTEROL: 105 MG/DL (ref 0–149)
NRBC BLD-RTO: 0 /100 WBCS (ref 0–0)
PLATELET # BLD AUTO: 199 X10*3/UL (ref 150–450)
POTASSIUM SERPL-SCNC: 4.3 MMOL/L (ref 3.5–5.3)
PROT SERPL-MCNC: 6.6 G/DL (ref 6.4–8.2)
PSA SERPL-MCNC: 1.86 NG/ML
RBC # BLD AUTO: 4.16 X10*6/UL (ref 4.5–5.9)
SODIUM SERPL-SCNC: 139 MMOL/L (ref 136–145)
TRIGL SERPL-MCNC: 91 MG/DL (ref 0–149)
VLDL: 18 MG/DL (ref 0–40)
WBC # BLD AUTO: 4.3 X10*3/UL (ref 4.4–11.3)

## 2024-08-30 PROCEDURE — 84153 ASSAY OF PSA TOTAL: CPT

## 2024-08-30 PROCEDURE — 36415 COLL VENOUS BLD VENIPUNCTURE: CPT

## 2024-08-30 PROCEDURE — 80053 COMPREHEN METABOLIC PANEL: CPT

## 2024-08-30 PROCEDURE — 80061 LIPID PANEL: CPT

## 2024-08-30 PROCEDURE — 85027 COMPLETE CBC AUTOMATED: CPT

## 2024-09-25 NOTE — PROGRESS NOTES
Subjective   Patient ID: Amol Borja is a 54 y.o. male who presents for Follow-up (Pt presents for 6 month follow up- pt states that he would like to discuss lab results).    HPI     Patient presents today for routine follow-up.  Patient concerns:    # Discuss labs  Showed some mild anemia, low white count   Patient denies any hematuria, hematochezia, or abnormal bleeding.  Last scope in 2023 was normal, no polyps, given 10 year repeat rec at that time.    ROUTINE VISIT  CHRONIC CONDITIONS:   Mood  Hx of anxiety, situational/performance related.  He is prescribed Propranolol 60 mg for as needed use.    Leukopenia  Chronic, mild, baseline white count in 4s, monitoring clinically  8/2024 wbc 4.3     HTN  2/2023 BP cuff validated.    Current regimen:  Lisinopril 20 mg daily  HCTZ 25 mg daily, increased from 12.5 in 2/2023 due suboptimal control.    Last CMP 8/2024 wnl.    Medications are being taken and tolerated well. No side effects are reported.  Patient is taking blood pressure outside of office and reports good control.  Patient denies chest pain, shortness of breath with exertion, palpitations, lower extremity edema, headache, or dizziness.     HLD/CAD  Taking Rosuvastatin 10 mg daily.   4/2022 CT chest showed mild CAD but no CACS in our records.    8/2024 TC/HDL ratio 3.1, LDL 87  9/2023 TC/HDL ratio 3.3,     Medication(s) are being taken as directed and tolerated well. No side effects reported.  Patient denies any facial droop, sudden vision loss, weakness or numbness on one side of body.   Patient denies chest pain, shortness of breath with exertion, palpitations, or symptoms of claudication.     Post-surgical hypoparathyroidism  Followed by sanya (Dr. Gutierrez).  Hx of medullary/papillary thyroid cancer, biopsy confirmed (hx of MEN-2).  S/p total thyroidectomy 6/2022, calcitonin level trended down from 78 to <2.0, subsequently started on Synthroid therapy.  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    Periodic monitoring of TFTs, calcitonin, and CEA levels done via endo.  Last visit with endo, 5/31/2024, labs noted to be okay, to [facility] and follow-up in 1 year.    Aortic dilation  No FMHx of aortic aneurysm per patient.    4/2022 CT chest with 4 cm ectasia of the ascending thoracic aorta  3/2023 CT chest with mild fusiform ectasia of the ascending thoracic aorta measuring 4 cm in AP diameter similar to prior.    Since stable per most recent CT chest done 3/2023 which showed mild fusiform ectasia of the ascending thoracic aorta measuring 4 cm in AP diameter similar to prior. No family history aneurysm, no further follow-up indicated at this time.     Lung nodule  4/2022 CT chest small bilateral pulmonary nodules measuring up to 0.5 cm are nonspecific and likely benign. However, continued follow-up is recommended per clinical staging protocol given patient has history of thyroid cancer.    3/2023 CT chest with previously described 5 mm to right lower lobe nodule unchanged, likely benign.    Some shortness of breath after exertion up stairs, etc       No chest pain, no diaphoresis that is new, no new swelling     Does have some residual pain in left foot/achiles-   has been doing physical therapy on his own      Just achey knee bothering him     Nutrition - thinks it's good,     does have salami or beef sandwiches at lunch (cans own peppers and pickles to put on sandwiches)         Had to stop running/ walking due to his foot       Review of Systems   All other systems reviewed and are negative.    No chest pain , no heaviness     Not working out due to pain in foot, thinks sob at flights of stairs is mild and from deconditioning     Declines coronary calcium score \"find things they just need to follow up on, leads to more tests\"        "  does not think barker is unexplained or severe enough to pursue cardio     Objective   /82 (BP Location: Left arm, Patient Position: Sitting, BP Cuff Size: Large adult)   Pulse 65   Temp 36.6 °C (97.9 °F) (Temporal)   Resp 16   Wt 103 kg (227 lb 3.2 oz)   SpO2 97%   BMI 29.98 kg/m²     Physical Exam  Vitals and nursing note reviewed.   Constitutional:       General: He is not in acute distress.     Appearance: Normal appearance. He is not toxic-appearing.   HENT:      Head: Normocephalic and atraumatic.   Cardiovascular:      Rate and Rhythm: Normal rate and regular rhythm.      Heart sounds: No murmur heard.     No friction rub. No gallop.   Pulmonary:      Effort: Pulmonary effort is normal.      Breath sounds: Normal breath sounds. No wheezing, rhonchi or rales.   Musculoskeletal:      Right lower leg: Edema (trace) present.      Left lower leg: Edema (trace) present.   Lymphadenopathy:      Cervical: No cervical adenopathy.   Skin:     General: Skin is warm and dry.   Neurological:      General: No focal deficit present.      Mental Status: He is alert and oriented to person, place, and time.   Psychiatric:         Mood and Affect: Mood normal.         Behavior: Behavior normal.         Assessment/Plan   Problem List Items Addressed This Visit             ICD-10-CM    Benign essential hypertension - Primary I10     BP is 128/82 in office today, adequate control  Goal BP is 130/80 or less, ideally 120/80 or less.     Continue Lisinopril 20 mg daily.  Continue HCTZ 25 mg daily.         Hyperlipemia E78.5     4/2022 CT chest showed mild CAD but no CACS in our records.    8/2024 TC/HDL ratio 3.1, LDL 87  9/2023 TC/HDL ratio 3.3,   Goal TC/HDL ratio 3.4 or less, LDL 99 or less,  or less, and TRIG 150 or less.     Cholesterol at goal, and improved from prior, continue with Rosuvastatin 10 mg daily.   Diet and exercise recommendations revisited.          Leukopenia D72.819     Chronic, mild,  baseline white count in 4s, monitoring clinically  8/2024 wbc 4.3  Will continue to monitor CBC on routine basis.         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 Gutierrez)  S/p total thyroidectomy 6/2022, currently on Synthroid therapy.  Periodic monitoring of TFTs, calcitonin, and CEA levels done via endo.   Last visit with endo, 5/31/2024, labs noted to be okay, to [facility] and follow-up in 1 year.         Post-surgical hypoparathyroidism (Multi) E89.2     Followed and managed by endo (Dr. Gutierrez)  S/p total thyroidectomy 6/2022, currently on Synthroid therapy.  Periodic monitoring of TFTs, calcitonin, and CEA levels done via endo.   Last visit with endo, 5/31/2024, labs noted to be okay, to [facility] and follow-up in 1 year.         Papillary thyroid carcinoma (Multi) C73     Followed and managed by endo (Dr. Gutierrez)  S/p total thyroidectomy 6/2022, currently on Synthroid therapy.  Periodic monitoring of TFTs, calcitonin, and CEA levels done via endo.   Last visit with endo, 5/31/2024, labs noted to be okay, to [facility] and follow-up in 1 year.         Mild CAD I25.10     See HLD a/p section.         Ascending aorta dilation (CMS-HCC) I77.810     Stable per most recent CT chest done 3/2023 which showed mild fusiform ectasia of the ascending thoracic aorta measuring 4 cm in AP diameter similar to prior. No family history aneurysm, no further follow-up indicated at this time.         Anemia, normocytic normochromic D64.9     Mild anemia noted on recent labs, will refer to hematology, order placed today.   Labs orders for ancillary studies placed to be done while awaiting hematology eval.  Last colonoscopy completed 7/2023 was normal, no polyps.  I have recommended he start a daily multivitamin.  Await labs and hematology recommendations.         Relevant Orders    Referral to Hematology and Oncology    CBC and Auto  Differential    Ferritin    Folate    Iron and TIBC    Vitamin B12    Lead, Venous    C-reactive protein         Follow-up in 6 months for routine care.  Call for sooner follow-up if needed.         Scribe Attestation  By signing my name below, I, Lorena Moore   attest that this documentation has been prepared under the direction and in the presence of Romi Smith DO.

## 2024-09-26 ENCOUNTER — APPOINTMENT (OUTPATIENT)
Dept: PRIMARY CARE | Facility: CLINIC | Age: 54
End: 2024-09-26
Payer: COMMERCIAL

## 2024-09-26 VITALS
OXYGEN SATURATION: 97 % | BODY MASS INDEX: 29.98 KG/M2 | SYSTOLIC BLOOD PRESSURE: 128 MMHG | DIASTOLIC BLOOD PRESSURE: 82 MMHG | TEMPERATURE: 97.9 F | WEIGHT: 227.2 LBS | RESPIRATION RATE: 16 BRPM | HEART RATE: 65 BPM

## 2024-09-26 DIAGNOSIS — I77.810 ASCENDING AORTA DILATION (CMS-HCC): ICD-10-CM

## 2024-09-26 DIAGNOSIS — E78.5 HYPERLIPIDEMIA, UNSPECIFIED HYPERLIPIDEMIA TYPE: ICD-10-CM

## 2024-09-26 DIAGNOSIS — D64.9 ANEMIA, NORMOCYTIC NORMOCHROMIC: ICD-10-CM

## 2024-09-26 DIAGNOSIS — I25.10 MILD CAD: ICD-10-CM

## 2024-09-26 DIAGNOSIS — C73 PAPILLARY THYROID CARCINOMA (MULTI): ICD-10-CM

## 2024-09-26 DIAGNOSIS — I10 BENIGN ESSENTIAL HYPERTENSION: Primary | ICD-10-CM

## 2024-09-26 DIAGNOSIS — D72.819 LEUKOPENIA, UNSPECIFIED TYPE: ICD-10-CM

## 2024-09-26 DIAGNOSIS — E89.2 POST-SURGICAL HYPOPARATHYROIDISM (MULTI): ICD-10-CM

## 2024-09-26 DIAGNOSIS — E31.22: ICD-10-CM

## 2024-09-26 PROBLEM — Z00.00 HEALTHCARE MAINTENANCE: Status: ACTIVE | Noted: 2024-09-26

## 2024-09-26 PROBLEM — R06.09 DOE (DYSPNEA ON EXERTION): Status: ACTIVE | Noted: 2024-09-26

## 2024-09-26 PROCEDURE — 3079F DIAST BP 80-89 MM HG: CPT | Performed by: FAMILY MEDICINE

## 2024-09-26 PROCEDURE — 1036F TOBACCO NON-USER: CPT | Performed by: FAMILY MEDICINE

## 2024-09-26 PROCEDURE — 3074F SYST BP LT 130 MM HG: CPT | Performed by: FAMILY MEDICINE

## 2024-09-26 PROCEDURE — 99214 OFFICE O/P EST MOD 30 MIN: CPT | Performed by: FAMILY MEDICINE

## 2024-09-26 NOTE — ASSESSMENT & PLAN NOTE
Chronic, mild, baseline white count in 4s, monitoring clinically  8/2024 wbc 4.3  Will continue to monitor CBC on routine basis.

## 2024-09-26 NOTE — ASSESSMENT & PLAN NOTE
4/2022 CT chest showed mild CAD but no CACS in our records.    8/2024 TC/HDL ratio 3.1, LDL 87  9/2023 TC/HDL ratio 3.3,   Goal TC/HDL ratio 3.4 or less, LDL 99 or less,  or less, and TRIG 150 or less.     Cholesterol at goal, and improved from prior, continue with Rosuvastatin 10 mg daily.   Diet and exercise recommendations revisited.

## 2024-09-26 NOTE — ASSESSMENT & PLAN NOTE
Mild anemia noted on recent labs, will refer to hematology, order placed today.   Labs orders for ancillary studies placed to be done while awaiting hematology eval.  Last colonoscopy completed 7/2023 was normal, no polyps.  I have recommended he start a daily multivitamin.  Await labs and hematology recommendations.

## 2024-09-26 NOTE — ASSESSMENT & PLAN NOTE
Offered stress test/ cardio referral, pt declines     Offered coronary artery calcium score to quantify (known small amount in other imaging)  pt declines     He is aware if any chest pressure, sweating, palpitations would need to seek care-  ER if feeling unwell or symptoms are severe

## 2024-09-26 NOTE — ASSESSMENT & PLAN NOTE
Followed and managed by endo (Dr. Gutierrez)  S/p total thyroidectomy 6/2022, currently on Synthroid therapy.  Periodic monitoring of TFTs, calcitonin, and CEA levels done via endo.   Last visit with endo, 5/31/2024, labs noted to be okay, to [facility] and follow-up in 1 year.

## 2024-09-26 NOTE — ASSESSMENT & PLAN NOTE
BP is 128/82 in office today, adequate control  Goal BP is 130/80 or less, ideally 120/80 or less.     Continue Lisinopril 20 mg daily.  Continue HCTZ 25 mg daily.

## 2024-09-26 NOTE — ASSESSMENT & PLAN NOTE
Followed and managed by endo (Dr. Gutierrez)  S/p total thyroidectomy 6/2022, currently on Synthroid therapy.  Periodic monitoring of TFTs, calcitonin, and CEA levels done via endo.   Last visit with endo, 5/31/2024, labs noted to be okay, to [facility] and follow-up in 1 year.

## 2024-09-26 NOTE — ASSESSMENT & PLAN NOTE
Followed and managed by endo (Dr. Gutierrez)  S/p total thyroidectomy 6/2022, currently on Synthroid therapy.  Periodic monitoring of TFTs, calcitonin, and CEA levels done via endo.   Last visit with endo, 5/31/2024, labs noted to be okay, to [facility] and follow-up in 1 year.

## 2024-09-26 NOTE — ASSESSMENT & PLAN NOTE
Followed and managed by endo (Dr. Gutierrez)  S/p total thyroidectomy 6/2022, currently on Synthroid therapy.  Periodic monitoring of TFTs, calcitonin, and CEA levels done via endo.   Last visit with endo, 5/31/2024, labs noted to be okay, to [facility] and follow-up in 1 year.

## 2024-11-05 ENCOUNTER — LAB (OUTPATIENT)
Dept: LAB | Facility: LAB | Age: 54
End: 2024-11-05
Payer: COMMERCIAL

## 2024-11-05 DIAGNOSIS — D64.9 ANEMIA, NORMOCYTIC NORMOCHROMIC: ICD-10-CM

## 2024-11-05 DIAGNOSIS — C73 PAPILLARY THYROID CARCINOMA (MULTI): ICD-10-CM

## 2024-11-05 PROCEDURE — 82746 ASSAY OF FOLIC ACID SERUM: CPT

## 2024-11-05 PROCEDURE — 83655 ASSAY OF LEAD: CPT

## 2024-11-05 PROCEDURE — 86140 C-REACTIVE PROTEIN: CPT

## 2024-11-05 PROCEDURE — 85025 COMPLETE CBC W/AUTO DIFF WBC: CPT

## 2024-11-05 PROCEDURE — 83540 ASSAY OF IRON: CPT

## 2024-11-05 PROCEDURE — 36415 COLL VENOUS BLD VENIPUNCTURE: CPT

## 2024-11-05 PROCEDURE — 82728 ASSAY OF FERRITIN: CPT

## 2024-11-05 PROCEDURE — 83550 IRON BINDING TEST: CPT

## 2024-11-05 PROCEDURE — 82607 VITAMIN B-12: CPT

## 2024-11-05 RX ORDER — LEVOTHYROXINE SODIUM 150 UG/1
150 TABLET ORAL DAILY
Qty: 90 TABLET | Refills: 3 | Status: SHIPPED | OUTPATIENT
Start: 2024-11-05

## 2024-11-06 LAB
BASOPHILS # BLD AUTO: 0.03 X10*3/UL (ref 0–0.1)
BASOPHILS NFR BLD AUTO: 0.7 %
CRP SERPL-MCNC: 0.11 MG/DL
EOSINOPHIL # BLD AUTO: 0.15 X10*3/UL (ref 0–0.7)
EOSINOPHIL NFR BLD AUTO: 3.3 %
ERYTHROCYTE [DISTWIDTH] IN BLOOD BY AUTOMATED COUNT: 13.3 % (ref 11.5–14.5)
FERRITIN SERPL-MCNC: 185 NG/ML (ref 20–300)
FOLATE SERPL-MCNC: 8 NG/ML
HCT VFR BLD AUTO: 44.6 % (ref 41–52)
HGB BLD-MCNC: 14.3 G/DL (ref 13.5–17.5)
IMM GRANULOCYTES # BLD AUTO: 0.01 X10*3/UL (ref 0–0.7)
IMM GRANULOCYTES NFR BLD AUTO: 0.2 % (ref 0–0.9)
IRON SATN MFR SERPL: 21 % (ref 25–45)
IRON SERPL-MCNC: 79 UG/DL (ref 35–150)
LEAD BLD-MCNC: 1.6 UG/DL
LEAD BLDV-MCNC: NORMAL UG/DL
LYMPHOCYTES # BLD AUTO: 1.26 X10*3/UL (ref 1.2–4.8)
LYMPHOCYTES NFR BLD AUTO: 28.1 %
MCH RBC QN AUTO: 31.4 PG (ref 26–34)
MCHC RBC AUTO-ENTMCNC: 32.1 G/DL (ref 32–36)
MCV RBC AUTO: 98 FL (ref 80–100)
MONOCYTES # BLD AUTO: 0.4 X10*3/UL (ref 0.1–1)
MONOCYTES NFR BLD AUTO: 8.9 %
NEUTROPHILS # BLD AUTO: 2.64 X10*3/UL (ref 1.2–7.7)
NEUTROPHILS NFR BLD AUTO: 58.8 %
NRBC BLD-RTO: 0 /100 WBCS (ref 0–0)
PLATELET # BLD AUTO: 241 X10*3/UL (ref 150–450)
RBC # BLD AUTO: 4.56 X10*6/UL (ref 4.5–5.9)
TIBC SERPL-MCNC: 384 UG/DL (ref 240–445)
UIBC SERPL-MCNC: 305 UG/DL (ref 110–370)
VIT B12 SERPL-MCNC: 381 PG/ML (ref 211–911)
WBC # BLD AUTO: 4.5 X10*3/UL (ref 4.4–11.3)

## 2024-11-29 DIAGNOSIS — I10 HYPERTENSION, ESSENTIAL: ICD-10-CM

## 2024-11-29 RX ORDER — HYDROCHLOROTHIAZIDE 25 MG/1
25 TABLET ORAL DAILY
Qty: 90 TABLET | Refills: 1 | Status: SHIPPED | OUTPATIENT
Start: 2024-11-29

## 2025-01-23 ENCOUNTER — TELEPHONE (OUTPATIENT)
Dept: HEMATOLOGY/ONCOLOGY | Facility: HOSPITAL | Age: 55
End: 2025-01-23
Payer: COMMERCIAL

## 2025-01-23 NOTE — TELEPHONE ENCOUNTER
RN called patient and left message on identifiable voice mail telling patient that his last CBC was normal. RN notified Dr. Smith that patient's last CBC was normal and she agreed that patient does not need to keep the appointment with Dr. Moffett. RN explained this in the voice mail and asked patient to call back to confirm that he understands. Call back instructions reviewed.      Patient called and patient is okay with canceling that appointment.

## 2025-01-30 ENCOUNTER — APPOINTMENT (OUTPATIENT)
Dept: HEMATOLOGY/ONCOLOGY | Facility: HOSPITAL | Age: 55
End: 2025-01-30
Payer: COMMERCIAL

## 2025-03-05 ENCOUNTER — TELEPHONE (OUTPATIENT)
Dept: PRIMARY CARE | Facility: CLINIC | Age: 55
End: 2025-03-05
Payer: COMMERCIAL

## 2025-03-05 DIAGNOSIS — Z13.0 SCREENING, ANEMIA, DEFICIENCY, IRON: ICD-10-CM

## 2025-03-05 DIAGNOSIS — Z13.220 SCREENING, LIPID: ICD-10-CM

## 2025-03-05 DIAGNOSIS — Z12.5 SCREENING PSA (PROSTATE SPECIFIC ANTIGEN): Primary | ICD-10-CM

## 2025-03-06 NOTE — TELEPHONE ENCOUNTER
Called and left detailed message with results for patient per consent, told patient to call if they have questions or concerns.

## 2025-04-03 ENCOUNTER — APPOINTMENT (OUTPATIENT)
Dept: PRIMARY CARE | Facility: CLINIC | Age: 55
End: 2025-04-03
Payer: COMMERCIAL

## 2025-04-03 DIAGNOSIS — C73 THYROID CANCER (MULTI): Primary | ICD-10-CM

## 2025-04-04 LAB
BASOPHILS # BLD AUTO: 22 CELLS/UL (ref 0–200)
BASOPHILS NFR BLD AUTO: 0.5 %
CHOLEST SERPL-MCNC: 186 MG/DL
CHOLEST/HDLC SERPL: 3.6 (CALC)
EOSINOPHIL # BLD AUTO: 101 CELLS/UL (ref 15–500)
EOSINOPHIL NFR BLD AUTO: 2.3 %
ERYTHROCYTE [DISTWIDTH] IN BLOOD BY AUTOMATED COUNT: 13.2 % (ref 11–15)
HCT VFR BLD AUTO: 44.5 % (ref 38.5–50)
HDLC SERPL-MCNC: 52 MG/DL
HGB BLD-MCNC: 14.4 G/DL (ref 13.2–17.1)
LDLC SERPL CALC-MCNC: 117 MG/DL (CALC)
LYMPHOCYTES # BLD AUTO: 1170 CELLS/UL (ref 850–3900)
LYMPHOCYTES NFR BLD AUTO: 26.6 %
MCH RBC QN AUTO: 31.1 PG (ref 27–33)
MCHC RBC AUTO-ENTMCNC: 32.4 G/DL (ref 32–36)
MCV RBC AUTO: 96.1 FL (ref 80–100)
MONOCYTES # BLD AUTO: 392 CELLS/UL (ref 200–950)
MONOCYTES NFR BLD AUTO: 8.9 %
NEUTROPHILS # BLD AUTO: 2715 CELLS/UL (ref 1500–7800)
NEUTROPHILS NFR BLD AUTO: 61.7 %
NONHDLC SERPL-MCNC: 134 MG/DL (CALC)
PLATELET # BLD AUTO: 203 THOUSAND/UL (ref 140–400)
PMV BLD REES-ECKER: 11.3 FL (ref 7.5–12.5)
PSA SERPL-MCNC: 2.05 NG/ML
RBC # BLD AUTO: 4.63 MILLION/UL (ref 4.2–5.8)
TRIGL SERPL-MCNC: 73 MG/DL
WBC # BLD AUTO: 4.4 THOUSAND/UL (ref 3.8–10.8)

## 2025-04-25 LAB
ALBUMIN SERPL-MCNC: 4.6 G/DL (ref 3.6–5.1)
BUN SERPL-MCNC: 18 MG/DL (ref 7–25)
BUN/CREAT SERPL: ABNORMAL (CALC) (ref 6–22)
CALCIT SERPL-MCNC: <2 PG/ML
CALCIUM SERPL-MCNC: 9.2 MG/DL (ref 8.6–10.3)
CHLORIDE SERPL-SCNC: 97 MMOL/L (ref 98–110)
CO2 SERPL-SCNC: 29 MMOL/L (ref 20–32)
CREAT SERPL-MCNC: 0.96 MG/DL (ref 0.7–1.3)
EGFRCR SERPLBLD CKD-EPI 2021: 93 ML/MIN/1.73M2
GLUCOSE SERPL-MCNC: 71 MG/DL (ref 65–99)
PHOSPHATE SERPL-MCNC: 4.3 MG/DL (ref 2.5–4.5)
POTASSIUM SERPL-SCNC: 3.8 MMOL/L (ref 3.5–5.3)
PTH-INTACT SERPL-MCNC: 22 PG/ML (ref 16–77)
SODIUM SERPL-SCNC: 135 MMOL/L (ref 135–146)
T4 FREE SERPL-MCNC: 1.9 NG/DL (ref 0.8–1.8)
THYROGLOB AB SERPL-ACNC: <1 IU/ML
THYROGLOB SERPL-MCNC: <0.1 NG/ML
TSH SERPL-ACNC: 0.64 MIU/L (ref 0.4–4.5)

## 2025-05-16 ENCOUNTER — APPOINTMENT (OUTPATIENT)
Dept: PRIMARY CARE | Facility: CLINIC | Age: 55
End: 2025-05-16
Payer: COMMERCIAL

## 2025-05-16 VITALS
BODY MASS INDEX: 31.8 KG/M2 | SYSTOLIC BLOOD PRESSURE: 150 MMHG | DIASTOLIC BLOOD PRESSURE: 90 MMHG | RESPIRATION RATE: 14 BRPM | WEIGHT: 239.9 LBS | OXYGEN SATURATION: 97 % | HEIGHT: 73 IN | TEMPERATURE: 97.9 F | HEART RATE: 67 BPM

## 2025-05-16 DIAGNOSIS — L71.9 ROSACEA: ICD-10-CM

## 2025-05-16 DIAGNOSIS — E78.5 HYPERLIPIDEMIA, UNSPECIFIED HYPERLIPIDEMIA TYPE: ICD-10-CM

## 2025-05-16 DIAGNOSIS — Z85.850 HISTORY OF THYROID CANCER: ICD-10-CM

## 2025-05-16 DIAGNOSIS — F41.8 PERFORMANCE ANXIETY: ICD-10-CM

## 2025-05-16 DIAGNOSIS — Z00.00 PHYSICAL EXAM, ANNUAL: Primary | ICD-10-CM

## 2025-05-16 DIAGNOSIS — Z90.89 S/P TOTAL THYROIDECTOMY: ICD-10-CM

## 2025-05-16 DIAGNOSIS — I77.810 ECTATIC THORACIC AORTA: ICD-10-CM

## 2025-05-16 DIAGNOSIS — Z98.890 S/P TOTAL THYROIDECTOMY: ICD-10-CM

## 2025-05-16 DIAGNOSIS — I10 BENIGN ESSENTIAL HYPERTENSION: ICD-10-CM

## 2025-05-16 DIAGNOSIS — E89.2 POST-SURGICAL HYPOPARATHYROIDISM (MULTI): ICD-10-CM

## 2025-05-16 DIAGNOSIS — E31.22: ICD-10-CM

## 2025-05-16 PROBLEM — C73 THYROID CANCER, MEDULLARY CARCINOMA: Status: RESOLVED | Noted: 2023-09-12 | Resolved: 2025-05-16

## 2025-05-16 PROBLEM — R91.1 LUNG NODULE: Status: RESOLVED | Noted: 2023-09-13 | Resolved: 2025-05-16

## 2025-05-16 PROBLEM — M76.60 ACHILLES TENDINITIS: Status: RESOLVED | Noted: 2024-07-31 | Resolved: 2025-05-16

## 2025-05-16 PROBLEM — D64.9 ANEMIA, NORMOCYTIC NORMOCHROMIC: Status: RESOLVED | Noted: 2024-09-26 | Resolved: 2025-05-16

## 2025-05-16 PROBLEM — C73 PAPILLARY THYROID CARCINOMA: Status: RESOLVED | Noted: 2023-09-12 | Resolved: 2025-05-16

## 2025-05-16 PROCEDURE — 3080F DIAST BP >= 90 MM HG: CPT | Performed by: FAMILY MEDICINE

## 2025-05-16 PROCEDURE — 99396 PREV VISIT EST AGE 40-64: CPT | Performed by: FAMILY MEDICINE

## 2025-05-16 PROCEDURE — 3077F SYST BP >= 140 MM HG: CPT | Performed by: FAMILY MEDICINE

## 2025-05-16 PROCEDURE — 3008F BODY MASS INDEX DOCD: CPT | Performed by: FAMILY MEDICINE

## 2025-05-16 PROCEDURE — 1036F TOBACCO NON-USER: CPT | Performed by: FAMILY MEDICINE

## 2025-05-16 RX ORDER — HYDROCHLOROTHIAZIDE 25 MG/1
25 TABLET ORAL DAILY
Qty: 90 TABLET | Refills: 3 | Status: SHIPPED | OUTPATIENT
Start: 2025-05-16

## 2025-05-16 RX ORDER — PROPRANOLOL HYDROCHLORIDE 60 MG/1
60 CAPSULE, EXTENDED RELEASE ORAL DAILY
Qty: 90 CAPSULE | Refills: 3 | Status: SHIPPED | OUTPATIENT
Start: 2025-05-16

## 2025-05-16 RX ORDER — ROSUVASTATIN CALCIUM 10 MG/1
10 TABLET, COATED ORAL DAILY
Qty: 90 TABLET | Refills: 3 | Status: SHIPPED | OUTPATIENT
Start: 2025-05-16

## 2025-05-16 RX ORDER — LISINOPRIL 20 MG/1
20 TABLET ORAL DAILY
Qty: 90 TABLET | Refills: 3 | Status: SHIPPED | OUTPATIENT
Start: 2025-05-16

## 2025-05-16 NOTE — PROGRESS NOTES
" Subjective   Patient ID: Fransico Borja \"Amol\" is a 55 y.o. male who presents for Annual Exam.  HPI  CPE     NEW to me, previous PCP : dr. Smith,  had routine check up with her 6 mo ago     HTN/ HL /  Foot pain / hx MEN II /  Hypothyroid     PSHx, FMHx, Problem / Med/ Allergy reviewed.     INTERVAL ..  CBC, Lipid, PSA in normal range     Concern :  Monitoring of Thyroid put back on PCP ; his mom-  had same issues as him and he reflects that her dose was not well managed by Her PCP. And she developed terrible pains in hands   Also, interested in Calcium Score   Review of Systems    SOCH  Etoh  few beers every weekend   No tob /   Caffeine   5-6 per day   Dtr is in Med School to be a pathologist     [facility]   [employer]     Objective   /90 (BP Location: Right arm, Patient Position: Sitting, BP Cuff Size: Large adult)   Pulse 67   Temp 36.6 °C (97.9 °F) (Temporal)   Resp 14   Ht 1.854 m (6' 1\")   Wt 109 kg (239 lb 14.4 oz)   SpO2 97%   BMI 31.65 kg/m²     Physical Exam  Constitutional:       Appearance: Normal appearance.   HENT:      Head: Normocephalic and atraumatic.      Right Ear: Tympanic membrane normal.      Left Ear: Tympanic membrane normal.   Eyes:      Extraocular Movements: Extraocular movements intact.      Conjunctiva/sclera: Conjunctivae normal.      Pupils: Pupils are equal, round, and reactive to light.   Cardiovascular:      Rate and Rhythm: Normal rate and regular rhythm.   Pulmonary:      Effort: Pulmonary effort is normal.      Breath sounds: Normal breath sounds.   Musculoskeletal:         General: Normal range of motion.      Cervical back: Normal range of motion.   Skin:     Comments: Rosacea   Neurological:      General: No focal deficit present.      Mental Status: He is alert.   Psychiatric:         Mood and Affect: Mood normal.         Thought Content: Thought content normal.         Judgment: Judgment normal.       Assessment/Plan   Problem List Items " Addressed This Visit          High    Benign essential hypertension    Relevant Medications    hydroCHLOROthiazide (HYDRODiuril) 25 mg tablet    lisinopril 20 mg tablet    History of thyroid cancer    Relevant Orders    Referral to Endocrinology    Hyperlipemia    Relevant Medications    rosuvastatin (Crestor) 10 mg tablet    Other Relevant Orders    CT cardiac scoring wo IV contrast    Post-surgical hypoparathyroidism (Multi)    Relevant Medications    calcium carbonate (Tums Extra Strength) 300 mg elemental (750 mg) chewable tablet    S/P total thyroidectomy    Relevant Orders    Referral to Endocrinology       Medium    Multiple endocrine neoplasia (MEN) type II (Multi)    Performance anxiety    Relevant Medications    propranolol LA (Inderal LA) 60 mg 24 hr capsule    Rosacea     Other Visit Diagnoses         Physical exam, annual    -  Primary    Relevant Orders    Follow Up In [facility] - PCP      Ectatic thoracic aorta (CMS-HCC)              Wellness  - preventive care and health maintenance reviewed and discussed   PSA 2025  Colonoscopy done 2023   Vax current    Post surgical Hypoparathyroidism  - calcium supplement( Tums)     MEN2    - I prefer he follow with Endocrinology. Pt voiced understanding. Has a professional connection at [facility].  Referral printed    Rosacea  - denies pain, nodules.     HTN   - elevated in office only   - meds refilled     HL    - CT calcium score ordered  - stay on current tx        CONNOR Rios MD

## 2025-05-16 NOTE — PATIENT INSTRUCTIONS
Nice to meet you today .   I refilled your medications  .     I ordered a CT Calcium Score test . Mónica, our Patient Services Mariam, can help with scheduling.  She can be reached at  - [phone] . Or you may call Referral Management at [phone].    I printed a referral to Endocrinology  so you can make your own appointment .     Followup  :  annual, sooner if issues arise.     Sincerely, Dr. Rios

## 2026-02-05 ENCOUNTER — APPOINTMENT (OUTPATIENT)
Dept: ENDOCRINOLOGY | Facility: CLINIC | Age: 56
End: 2026-02-05
Payer: COMMERCIAL